# Patient Record
(demographics unavailable — no encounter records)

---

## 2025-05-07 NOTE — HISTORY OF PRESENT ILLNESS
[N] : Patient does not use contraception [Y] : Positive pregnancy history [LMPDate] : 4/13/25 [PGxTotal] : 1 [PGxPremature] : 1 [[facility] [Menarche Age: ____] : age at menarche was [unfilled] [FreeTextEntry1] : 4/13/25 [No] : Patient does not have concerns regarding sex [Currently Active] : currently active

## 2025-06-26 NOTE — HISTORY OF PRESENT ILLNESS
[FreeTextEntry1] : 27-year-old F LMP 5/13/2025 presenting due to positive pregnancy test at home two weeks ago. She endorses increased nausea and heartburn. She also is having lower back pain. No abdominal pain or vaginal bleeding. ROS otherwise negative.

## 2025-06-26 NOTE — DISCUSSION/SUMMARY
[FreeTextEntry1] : 27-year-old F  at 6w2d by LMP 2025 with bedside US performed today noting +GS +yolk sac +fetal pole +cardiac activity, CRL 0.79 c/w LMP.   - Bloodwork drawn today  - Switch PNV to folic acid. Prescribed Unisom/B6.  - Discussed MSK nature back pain & use of supportive care measures.  RTO in 2 weeks for viability US / results.

## 2025-07-10 NOTE — HISTORY OF PRESENT ILLNESS
[N] : Patient does not use contraception [Y] : Positive pregnancy history [FreeTextEntry1] : Giovanni presents today for results of viability sonogram. She reports her nausea/heartburn are much improved. No pelvic pain or vaginal bleeding. ROS negative.  [PapSmeardate] : 5/7/25 [TextBox_31] : judit [LMPDate] : 5/13/25 [PGHxTotal] : 2 [[facility] Hospital[facility]] : 1 [[facility]] : 1

## 2025-07-10 NOTE — DISCUSSION/SUMMARY
[FreeTextEntry1] : 27-year-old  at 8 weeks 2 days by LMP 2025 consistent with first trimester ultrasound today.  Precautions reviewed   RTO in 2 weeks for NPN / NPN labs /  [facility] + [facility] labs